# Patient Record
Sex: MALE | Race: WHITE | NOT HISPANIC OR LATINO | Employment: FULL TIME | URBAN - METROPOLITAN AREA
[De-identification: names, ages, dates, MRNs, and addresses within clinical notes are randomized per-mention and may not be internally consistent; named-entity substitution may affect disease eponyms.]

---

## 2022-07-15 ENCOUNTER — HOSPITAL ENCOUNTER (EMERGENCY)
Facility: HOSPITAL | Age: 57
Discharge: HOME | End: 2022-07-15
Attending: EMERGENCY MEDICINE | Admitting: EMERGENCY MEDICINE

## 2022-07-15 VITALS
DIASTOLIC BLOOD PRESSURE: 79 MMHG | HEIGHT: 72 IN | SYSTOLIC BLOOD PRESSURE: 136 MMHG | TEMPERATURE: 97.5 F | RESPIRATION RATE: 20 BRPM | OXYGEN SATURATION: 98 % | BODY MASS INDEX: 42.25 KG/M2 | WEIGHT: 311.9 LBS | HEART RATE: 76 BPM

## 2022-07-15 DIAGNOSIS — T43.611A ACCIDENTAL CAFFEINE OVERDOSE, INITIAL ENCOUNTER: Primary | ICD-10-CM

## 2022-07-15 LAB — QT INTERVAL: 376 MS

## 2022-07-15 PROCEDURE — 99283 EMERGENCY DEPT VISIT LOW MDM: CPT

## 2022-07-15 PROCEDURE — 93005 ELECTROCARDIOGRAM TRACING: CPT | Performed by: EMERGENCY MEDICINE

## 2022-07-15 PROCEDURE — 93010 ELECTROCARDIOGRAM REPORT: CPT | Performed by: INTERNAL MEDICINE

## 2022-07-15 PROCEDURE — 63710000001 ONDANSETRON ODT 4 MG TABLET DISPERSIBLE: Performed by: EMERGENCY MEDICINE

## 2022-07-15 RX ORDER — ONDANSETRON 4 MG/1
4 TABLET, ORALLY DISINTEGRATING ORAL ONCE
Status: COMPLETED | OUTPATIENT
Start: 2022-07-15 | End: 2022-07-15

## 2022-07-15 RX ADMIN — ONDANSETRON 4 MG: 4 TABLET, ORALLY DISINTEGRATING ORAL at 04:51

## 2022-07-15 NOTE — DISCHARGE INSTRUCTIONS
Call the patient connection line today for primary care provider to follow-up with within 1 week.  Return to the emergency department there is chest pain, shortness of breath, worse in any way at all.

## 2022-07-15 NOTE — ED PROVIDER NOTES
"Subjective   History of Present Illness  History of Present Illness    Chief complaint: Patient feels shaky    Location: Generalized    Quality/Severity: Moderate    Timing/Onset/Duration: Started this morning    Modifying Factors: Provoked by taking 4 caffeine pills, 2 at 10:30 AM to 2 AM    Associated Symptoms: No headache.  No fever chills or cough.  No sore throat earache or nasal congestion.  No chest pain.  The patient has some mild shortness of breath.  He had diaphoresis.  He had nonbloody diarrhea.    Narrative: This 57-year-old white male presents stating that he is shaking and feeling a little lightheaded for the last 2 hours.  He took 4 caffeine pills within 3 hours.  He denies use of illicit drugs.    PCP: No primary care provider      Review of Systems   Constitutional: Positive for diaphoresis. Negative for chills and fever.   HENT: Negative for congestion, ear pain and sore throat.    Respiratory: Positive for shortness of breath. Negative for cough.    Cardiovascular: Negative for chest pain and palpitations.   Gastrointestinal: Positive for diarrhea. Negative for abdominal pain, nausea and vomiting.   Genitourinary: Negative for dysuria.   Skin: Negative for rash.   Neurological: Negative for headaches.        Medication List      You have not been prescribed any medications.         History reviewed. No pertinent past medical history.    Allergies   Allergen Reactions   • Penicillins Unknown - High Severity     \"as a kid it almost killed me\"       Past Surgical History:   Procedure Laterality Date   • BACK SURGERY         History reviewed. No pertinent family history.    Social History     Socioeconomic History   • Marital status: Unknown   Tobacco Use   • Smoking status: Current Every Day Smoker     Packs/day: 1.50   Substance and Sexual Activity   • Alcohol use: Yes     Comment: rare   • Drug use: Never           Objective   Physical Exam  Vitals (The temperature is 97.5 °F, pulse 90, " respirations 26, /82, room air pulse ox 98%.) and nursing note reviewed.   Constitutional:       Comments: Anxious appearing   HENT:      Head: Normocephalic and atraumatic.      Mouth/Throat:      Mouth: Mucous membranes are moist.   Eyes:      Extraocular Movements: Extraocular movements intact.      Pupils: Pupils are equal, round, and reactive to light.   Cardiovascular:      Rate and Rhythm: Normal rate and regular rhythm.      Pulses: Normal pulses.      Heart sounds: Normal heart sounds. No murmur heard.    No friction rub. No gallop.   Pulmonary:      Effort: Pulmonary effort is normal.      Breath sounds: Normal breath sounds.   Abdominal:      General: Abdomen is flat. Bowel sounds are normal. There is no distension.      Palpations: Abdomen is soft. There is no mass.      Tenderness: There is no abdominal tenderness. There is no guarding or rebound.      Hernia: No hernia is present.   Musculoskeletal:         General: Normal range of motion.      Cervical back: Normal range of motion and neck supple.   Skin:     General: Skin is warm and dry.   Neurological:      General: No focal deficit present.      Mental Status: He is alert and oriented to person, place, and time.         Procedures           ED Course      04:57 EDT, 07/15/22:  The EKG was obtained at 453 and read by me at 455.  The EKG shows a normal sinus rhythm with rate of 87.  There is a left axis deviation with borderline intraventricular conduction delay.  The SC, and QT intervals are unremarkable.  There is no ectopy.  There is no acute ST elevation or depression     06:04 EDT, 07/15/22:  The patient was reassessed.  He feels much better.  He has tolerated clear liquids.  The vital signs reviewed and are stable.    06:04 EDT, 07/15/22:  Patient's diagnosis of caffeine overdose was discussed with him.  The patient should avoid taking caffeine pills.  He should call the patient connection line for primary care provider to follow-up with  next week.  He should return to the emergency department if there is chest pain, shortness of breath, worse in any way at all.  All patient questions were answered he will be discharged in good condition.                                MDM    Final diagnoses:   None       ED Disposition  ED Disposition     None          No follow-up provider specified.       Medication List      No changes were made to your prescriptions during this visit.          Jeromy Yung MD  07/15/22 0610